# Patient Record
Sex: FEMALE | Race: WHITE
[De-identification: names, ages, dates, MRNs, and addresses within clinical notes are randomized per-mention and may not be internally consistent; named-entity substitution may affect disease eponyms.]

---

## 2017-12-30 ENCOUNTER — HOSPITAL ENCOUNTER (EMERGENCY)
Dept: HOSPITAL 63 - ER | Age: 27
Discharge: HOME | End: 2017-12-30
Payer: COMMERCIAL

## 2017-12-30 VITALS — WEIGHT: 198 LBS | HEIGHT: 62 IN | BODY MASS INDEX: 36.44 KG/M2

## 2017-12-30 VITALS — SYSTOLIC BLOOD PRESSURE: 136 MMHG | DIASTOLIC BLOOD PRESSURE: 72 MMHG

## 2017-12-30 DIAGNOSIS — K58.0: ICD-10-CM

## 2017-12-30 DIAGNOSIS — G43.909: ICD-10-CM

## 2017-12-30 DIAGNOSIS — R11.2: ICD-10-CM

## 2017-12-30 DIAGNOSIS — J45.909: ICD-10-CM

## 2017-12-30 DIAGNOSIS — E86.0: Primary | ICD-10-CM

## 2017-12-30 LAB
ALBUMIN SERPL-MCNC: 2.7 G/DL (ref 3.4–5)
ALBUMIN/GLOB SERPL: 0.8 {RATIO} (ref 1–1.7)
ALP SERPL-CCNC: 97 U/L (ref 46–116)
ALT SERPL-CCNC: 194 U/L (ref 14–59)
ANION GAP SERPL CALC-SCNC: 11 MMOL/L (ref 6–14)
AST SERPL-CCNC: 260 U/L (ref 15–37)
BASOPHILS # BLD AUTO: 0 X10^3/UL (ref 0–0.2)
BASOPHILS NFR BLD: 1 % (ref 0–3)
BILIRUB SERPL-MCNC: 0.1 MG/DL (ref 0.2–1)
BUN/CREAT SERPL: 9 (ref 6–20)
CA-I SERPL ISE-MCNC: 9 MG/DL (ref 7–20)
CALCIUM SERPL-MCNC: 8.1 MG/DL (ref 8.5–10.1)
CHLORIDE SERPL-SCNC: 104 MMOL/L (ref 98–107)
CO2 SERPL-SCNC: 25 MMOL/L (ref 21–32)
CREAT SERPL-MCNC: 1 MG/DL (ref 0.6–1)
EOSINOPHIL NFR BLD: 0 % (ref 0–3)
EOSINOPHIL NFR BLD: 0 X10^3/UL (ref 0–0.7)
ERYTHROCYTE [DISTWIDTH] IN BLOOD BY AUTOMATED COUNT: 14.2 % (ref 11.5–14.5)
GFR SERPLBLD BASED ON 1.73 SQ M-ARVRAT: 66.5 ML/MIN
GLOBULIN SER-MCNC: 3.5 G/DL (ref 2.2–3.8)
GLUCOSE SERPL-MCNC: 108 MG/DL (ref 70–99)
HCT VFR BLD CALC: 39.9 % (ref 36–47)
HGB BLD-MCNC: 14 G/DL (ref 12–15.5)
LYMPHOCYTES # BLD: 0.9 X10^3/UL (ref 1–4.8)
LYMPHOCYTES NFR BLD AUTO: 22 % (ref 24–48)
MCH RBC QN AUTO: 29 PG (ref 25–35)
MCHC RBC AUTO-ENTMCNC: 35 G/DL (ref 31–37)
MCV RBC AUTO: 82 FL (ref 79–100)
MONO #: 0.6 X10^3/UL (ref 0–1.1)
MONOCYTES NFR BLD: 15 % (ref 0–9)
NEUT #: 2.5 X10^3UL (ref 1.8–7.7)
NEUTROPHILS NFR BLD AUTO: 63 % (ref 31–73)
PLATELET # BLD AUTO: 208 X10^3/UL (ref 140–400)
POTASSIUM SERPL-SCNC: 3.1 MMOL/L (ref 3.5–5.1)
PROT SERPL-MCNC: 6.2 G/DL (ref 6.4–8.2)
RBC # BLD AUTO: 4.88 X10^6/UL (ref 3.5–5.4)
SODIUM SERPL-SCNC: 140 MMOL/L (ref 136–145)
WBC # BLD AUTO: 4 X10^3/UL (ref 4–11)

## 2017-12-30 PROCEDURE — 36415 COLL VENOUS BLD VENIPUNCTURE: CPT

## 2017-12-30 PROCEDURE — 85025 COMPLETE CBC W/AUTO DIFF WBC: CPT

## 2017-12-30 PROCEDURE — 96375 TX/PRO/DX INJ NEW DRUG ADDON: CPT

## 2017-12-30 PROCEDURE — 99284 EMERGENCY DEPT VISIT MOD MDM: CPT

## 2017-12-30 PROCEDURE — 96374 THER/PROPH/DIAG INJ IV PUSH: CPT

## 2017-12-30 PROCEDURE — 80053 COMPREHEN METABOLIC PANEL: CPT

## 2017-12-30 PROCEDURE — 96361 HYDRATE IV INFUSION ADD-ON: CPT

## 2017-12-30 NOTE — PHYS DOC
Past History


Past Medical History:  IBS, Migraines


Past Surgical History:  No Surgical History


Alcohol Use:  Occasionally


Drug Use:  None





Adult General


Chief Complaint


Chief Complaint:  NAUSEA/VOMITING/DIARRHEA





HPI


HPI





Patient is a 27-year-old female brought to the ED by her mom with a complaint 

of vomiting and diarrhea, not feeling well. Patient began to have vomiting 4 

days ago on Wednesday. Between Wednesday and Thursday she thinks she vomited 18 

times. She then developed diarrhea and had diarrhea several times, the last 

time was last evening. She continues to just feel not well. She has a cough 

that is productive and sometimes has some streaks of blood. She had abdominal 

discomfort but it's not as bad as it was. She has been feverish.





Patient was seen in her doctor's office yesterday and was given a shot of 

antibiotic, not sure what it was. It sounds like they thought she might have 

had a UTI.





Patient denies possibility of pregnancy. She uses NuvaRing.





Patient has a history of asthma, she does feel a little wheezy in the upper 

lungs.





Review of Systems


Review of Systems





Constitutional: She has felt feverish and had chills


HENT: Positive nasal congestion


Respiratory: Positive cough, productive at times


GI: As in history of present illness


: Denies dysuria or hematuria []


Musculoskeletal: She has had generalized musculoskeletal aches


Integument: Denies rash or skin lesions []


Neurologic: Denies headache, focal weakness or sensory changes []





Current Medications


Current Medications





Current Medications








 Medications


  (Trade)  Dose


 Ordered  Sig/Dayna  Start Time


 Stop Time Status Last Admin


Dose Admin


 


 Ketorolac


 Tromethamine


  (Toradol)  30 mg  1X  ONCE  12/30/17 17:45


 12/30/17 17:46 DC 12/30/17 17:51


30 MG


 


 Sodium Chloride  1,000 ml @ 


 1,000 mls/hr  Q1H  12/30/17 17:45


 12/30/17 18:44  12/30/17 17:47


1,000 MLS/HR











Allergies


Allergies





Allergies








Coded Allergies Type Severity Reaction Last Updated Verified


 


  No Known Drug Allergies    5/22/16 No











Physical Exam


Physical Exam





Constitutional: Obese female, alert, mentating normally, warm and dry. Pulse ox 

on room air 97-98%. Heart rate at rest on the cart is in the 130s.


HENT: Normocephalic, atraumatic, bilateral external ears normal,  nose normal. [

]


Eyes:  conjunctiva normal, no discharge. [] 


Neck: Normal range of motion,  no stridor. [] 


Cardiovascular:Heart rate regular rhythm, no murmur , tachycardia


Lungs & Thorax:  Bilateral breath sounds clear to auscultation with good air 

movement throughout and no wheezes heard


Abdomen: Bowel sounds normal, soft, nondistended, no tenderness, no masses, no 

pulsatile masses. [] 


Skin: Warm, dry, no erythema, no rash. [] 


Extremities: No tenderness, no cyanosis, no clubbing, ROM intact, no edema. [] 


Neurologic: Alert and oriented X 3, normal motor function, no focal deficits 

noted. []





EKG


EKG


[]





Radiology/Procedures


Radiology/Procedures


[]





Course & Med Decision Making


Course & Med Decision Making


Pertinent Labs and Imaging studies reviewed. (See chart for details)





27-year-old female in good general health presents with 4 days of what sounds 

like probably a viral gastroenteritis, started with many episodes of vomiting 

and then had several episodes of diarrhea. It sounds like that has passed. She 

now has cough, general fatigue. She is tachycardic, I suspect she is 

dehydrated. I advised the patient we will check some labs and give her some IV 

fluids, she is agreeable to that.





Patient's heart rate improved after 1 L, we will give her a second liter. Labs 

consistent with viral syndrome. She has Zofran at home. She is stable for 

discharge. See instructions for plan.





[]





Dragon Disclaimer


Dragon Disclaimer


This electronic medical record was generated, in whole or in part, using a 

voice recognition dictation system.





Departure


Departure:


Impression:  


 Primary Impression:  


 Nausea and vomiting


 Additional Impression:  


 Dehydration


Disposition:  01 HOME, SELF-CARE


Condition:  IMPROVED


Referrals:  


MADISON DANG (PCP)


Patient Instructions:  Nausea and Vomiting, Easy-to-Read





Additional Instructions:  


Small amounts of clear liquids frequently until your stomach settles. Recheck 

with your doctor if not better in 2-3 days. Return sooner if worse.





Problem Qualifiers











MANSOOR WILLIAM MD Dec 30, 2017 18:03

## 2018-04-05 ENCOUNTER — HOSPITAL ENCOUNTER (OUTPATIENT)
Dept: HOSPITAL 61 - KCIC MRI | Age: 28
Discharge: HOME | End: 2018-04-05
Payer: COMMERCIAL

## 2018-04-05 DIAGNOSIS — R07.9: ICD-10-CM

## 2018-04-05 DIAGNOSIS — M54.5: Primary | ICD-10-CM

## 2018-04-05 PROCEDURE — 72148 MRI LUMBAR SPINE W/O DYE: CPT

## 2018-06-12 ENCOUNTER — HOSPITAL ENCOUNTER (OUTPATIENT)
Dept: HOSPITAL 61 - KCIC MRI | Age: 28
Discharge: HOME | End: 2018-06-12
Payer: COMMERCIAL

## 2018-06-12 DIAGNOSIS — M25.551: Primary | ICD-10-CM

## 2018-06-12 PROCEDURE — 73721 MRI JNT OF LWR EXTRE W/O DYE: CPT

## 2018-07-04 ENCOUNTER — HOSPITAL ENCOUNTER (EMERGENCY)
Dept: HOSPITAL 63 - ER | Age: 28
Discharge: HOME | End: 2018-07-04
Payer: COMMERCIAL

## 2018-07-04 VITALS — BODY MASS INDEX: 36.82 KG/M2 | WEIGHT: 195 LBS | HEIGHT: 61 IN

## 2018-07-04 VITALS — DIASTOLIC BLOOD PRESSURE: 80 MMHG | SYSTOLIC BLOOD PRESSURE: 123 MMHG

## 2018-07-04 DIAGNOSIS — J35.8: ICD-10-CM

## 2018-07-04 DIAGNOSIS — K58.9: ICD-10-CM

## 2018-07-04 DIAGNOSIS — J45.909: ICD-10-CM

## 2018-07-04 DIAGNOSIS — G43.909: ICD-10-CM

## 2018-07-04 DIAGNOSIS — J02.9: Primary | ICD-10-CM

## 2018-07-04 LAB
ALBUMIN SERPL-MCNC: 2.9 G/DL (ref 3.4–5)
ALBUMIN/GLOB SERPL: 0.7 {RATIO} (ref 1–1.7)
ALP SERPL-CCNC: 170 U/L (ref 46–116)
ALT SERPL-CCNC: 43 U/L (ref 14–59)
ANION GAP SERPL CALC-SCNC: 10 MMOL/L (ref 6–14)
AST SERPL-CCNC: 46 U/L (ref 15–37)
BASOPHILS # BLD AUTO: 0 X10^3/UL (ref 0–0.2)
BASOPHILS NFR BLD: 0 % (ref 0–3)
BILIRUB SERPL-MCNC: 0.3 MG/DL (ref 0.2–1)
BUN/CREAT SERPL: 12 (ref 6–20)
CA-I SERPL ISE-MCNC: 11 MG/DL (ref 7–20)
CALCIUM SERPL-MCNC: 8.5 MG/DL (ref 8.5–10.1)
CHLORIDE SERPL-SCNC: 104 MMOL/L (ref 98–107)
CO2 SERPL-SCNC: 25 MMOL/L (ref 21–32)
CREAT SERPL-MCNC: 0.9 MG/DL (ref 0.6–1)
EOSINOPHIL NFR BLD: 0.1 X10^3/UL (ref 0–0.7)
EOSINOPHIL NFR BLD: 1 % (ref 0–3)
ERYTHROCYTE [DISTWIDTH] IN BLOOD BY AUTOMATED COUNT: 14.5 % (ref 11.5–14.5)
GFR SERPLBLD BASED ON 1.73 SQ M-ARVRAT: 75.1 ML/MIN
GLOBULIN SER-MCNC: 4.4 G/DL (ref 2.2–3.8)
GLUCOSE SERPL-MCNC: 91 MG/DL (ref 70–99)
HCT VFR BLD CALC: 40.8 % (ref 36–47)
HGB BLD-MCNC: 13.8 G/DL (ref 12–15.5)
LYMPHOCYTES # BLD: 2 X10^3/UL (ref 1–4.8)
LYMPHOCYTES NFR BLD AUTO: 24 % (ref 24–48)
MCH RBC QN AUTO: 28 PG (ref 25–35)
MCHC RBC AUTO-ENTMCNC: 34 G/DL (ref 31–37)
MCV RBC AUTO: 82 FL (ref 79–100)
MONO #: 0.7 X10^3/UL (ref 0–1.1)
MONOCYTES NFR BLD: 8 % (ref 0–9)
MONONUCLEOSIS PATIENT: NEGATIVE
NEUT #: 5.6 X10^3UL (ref 1.8–7.7)
NEUTROPHILS NFR BLD AUTO: 66 % (ref 31–73)
PLATELET # BLD AUTO: 191 X10^3/UL (ref 140–400)
POTASSIUM SERPL-SCNC: 3.9 MMOL/L (ref 3.5–5.1)
PROT SERPL-MCNC: 7.3 G/DL (ref 6.4–8.2)
RBC # BLD AUTO: 4.96 X10^6/UL (ref 3.5–5.4)
SODIUM SERPL-SCNC: 139 MMOL/L (ref 136–145)
WBC # BLD AUTO: 8.5 X10^3/UL (ref 4–11)

## 2018-07-04 PROCEDURE — 99285 EMERGENCY DEPT VISIT HI MDM: CPT

## 2018-07-04 PROCEDURE — 36415 COLL VENOUS BLD VENIPUNCTURE: CPT

## 2018-07-04 PROCEDURE — 87070 CULTURE OTHR SPECIMN AEROBIC: CPT

## 2018-07-04 PROCEDURE — 80053 COMPREHEN METABOLIC PANEL: CPT

## 2018-07-04 PROCEDURE — 86308 HETEROPHILE ANTIBODY SCREEN: CPT

## 2018-07-04 PROCEDURE — 96365 THER/PROPH/DIAG IV INF INIT: CPT

## 2018-07-04 PROCEDURE — 87880 STREP A ASSAY W/OPTIC: CPT

## 2018-07-04 PROCEDURE — 70491 CT SOFT TISSUE NECK W/DYE: CPT

## 2018-07-04 PROCEDURE — 85025 COMPLETE CBC W/AUTO DIFF WBC: CPT

## 2018-07-04 PROCEDURE — 83605 ASSAY OF LACTIC ACID: CPT

## 2018-07-04 PROCEDURE — 84702 CHORIONIC GONADOTROPIN TEST: CPT

## 2018-07-04 PROCEDURE — 96375 TX/PRO/DX INJ NEW DRUG ADDON: CPT

## 2018-07-04 NOTE — RAD
EXAM: Neck CT with intravenous contrast.

 

HISTORY: Sore throat.

 

TECHNIQUE: Computed tomographic images of the neck were obtained following

the administration of 75 cc Omnipaque 300 intravenous contrast.

 

*One or more of the following individualized dose reduction techniques 

were utilized for this examination:  

1. Automated exposure control.  

2. Adjustment of the mA and/or kV according to patient size.  

3. Use of iterative reconstruction technique.

 

COMPARISON: None.

 

FINDINGS: There is symmetric prominent bilateral tonsillar soft tissue. No

clear loculated fluid collection to suggest an abscess is seen. The airway

is patent. The parotid glands are unremarkable. There is a tiny focus of 

gas adjacent to the right submandibular gland, likely within a vein and 

due to recent peripheral catheterization. The submandibular glands are 

otherwise unremarkable. The thyroid gland is unremarkable. There are 

symmetric prominent submandibular lymph nodes. The lung apices are 

unremarkable. The superior mediastinum is unremarkable. The visualized 

portions the brain are unremarkable. The paranasal sinuses are clear. The 

orbits and mastoid air cells are unremarkable. There is no suspicious 

osseous lesion.

 

IMPRESSION:

1. Prominent bilateral tonsillar soft tissue. This can be seen with 

tonsillitis. No abscess is seen.

2. Prominent submandibular lymph nodes, likely physiologic or reactive in 

etiology.

 

Electronically signed by: Deirdre Llanos MD (7/4/2018 1:39 PM) [facility]

## 2018-07-04 NOTE — PHYS DOC
Past History


Past Medical History:  Asthma, IBS, Migraines


Past Surgical History:  No Surgical History


Alcohol Use:  Rarely


Drug Use:  None





Adult General


Chief Complaint


Chief Complaint:  SORE THROAT





HPI


HPI





27-year-old female patient states she has had sore throat for the last 8 days 

and currently taking amoxicillin for several days without improvement of her 

condition. Patient complaining of intermittent episodes of fever up to 100.4 

and nausea and headache problems swallowing for the same time. Patient states 

she had multiple episodes of sore throat several years ago and 2 episodes of 

strep infection for the last few months was not referred to ENT. Patient states 

her pain getting worse today and rated her pain 7/10.





Review of Systems


Review of Systems





Constitutional: Reports fever


Eyes: Denies change in visual acuity, redness, or eye pain []


HENT: Reports sore throat and earache


Respiratory: Reports mild cough, denies shortness of breath


Cardiovascular: No additional information not addressed in HPI []


GI: Denies abdominal pain, vomiting, bloody stools or diarrhea, reports nausea [

]


: Denies dysuria or hematuria []


Musculoskeletal: Denies back pain or joint pain []


Integument: Denies rash or skin lesions []


Neurologic: Denies headache, focal weakness or sensory changes []


Endocrine: Denies polyuria or polydipsia []





All other systems were reviewed and found to be within normal limits, except as 

documented in this note.





Current Medications


Current Medications





Current Medications








 Medications


  (Trade)  Dose


 Ordered  Sig/Dayna  Start Time


 Stop Time Status Last Admin


Dose Admin


 


 Clindamycin


 Phosphate  50 ml @ 


 100 mls/hr  1X  ONCE  18 12:30


 18 12:59  18 12:11


100 MLS/HR


 


 Iohexol


  (Omnipaque 300


 Mg/ml)  75 ml  1X  ONCE  18 12:15


 18 12:16 DC  





 


 Ketorolac


 Tromethamine


  (Toradol)  30 mg  1X  ONCE  18 12:30


 18 12:31 DC 18 12:10


30 MG


 


 Methylprednisolone


 Sodium Succinate


  (SOLU-Medrol


 125MG VIAL)  125 mg  1X  ONCE  18 12:30


 18 12:31 DC 18 12:10


125 MG


 


 Sodium Chloride  1,000 ml @ 


 1,000 mls/hr  1X  ONCE  18 11:45


 18 12:44  18 12:10


1,000 MLS/HR











Allergies


Allergies





Allergies








Coded Allergies Type Severity Reaction Last Updated Verified


 


  No Known Drug Allergies    16 No











Physical Exam


Physical Exam





Constitutional: Well developed, well nourished, mild, non-toxic appearance. []


HENT: Normocephalic, atraumatic, bilateral external ears normal, oropharynx 

moist, bilateral tonsillar edema and erythema with exudates, nose normal. []


Eyes: PERRLA, EOMI, conjunctiva normal, no discharge. [] 


Neck: Normal range of motion, no tenderness, supple, no stridor. [] 


Cardiovascular: Tachycardia, no murmur []


Lungs & Thorax:  Bilateral breath sounds clear to auscultation []


Abdomen: Bowel sounds normal, soft, no tenderness, no masses, no pulsatile 

masses. [] 


Skin: Warm, dry, no erythema, no rash. [] 


Back: No tenderness, no CVA tenderness. [] 


Extremities: No tenderness, no cyanosis, no clubbing, ROM intact, no edema. [] 


Neurologic: Alert and oriented X 3, normal motor function, normal sensory 

function, no focal deficits noted. []


Psychologic: Affect normal, judgement normal, mood normal. []





Current Patient Data


Vital Signs





 Vital Signs








  Date Time  Temp Pulse Resp B/P (MAP) Pulse Ox O2 Delivery O2 Flow Rate FiO2


 


18 11:22 98.7 119 20  95 Room Air  








Lab Results





 Laboratory Tests








Test


 18


11:45 18


12:00


 


Group A Streptococcus Rapid


 Negative


(NEGATIVE) 





 


White Blood Count


 


 8.5 x10^3/uL


(4.0-11.0)


 


Red Blood Count


 


 4.96 x10^6/uL


(3.50-5.40)


 


Hemoglobin


 


 13.8 g/dL


(12.0-15.5)


 


Hematocrit


 


 40.8 %


(36.0-47.0)


 


Mean Corpuscular Volume


 


 82 fL ()





 


Mean Corpuscular Hemoglobin  28 pg (25-35)  


 


Mean Corpuscular Hemoglobin


Concent 


 34 g/dL


(31-37)


 


Red Cell Distribution Width


 


 14.5 %


(11.5-14.5)


 


Platelet Count


 


 191 x10^3/uL


(140-400)


 


Neutrophils (%) (Auto)  66 % (31-73)  


 


Lymphocytes (%) (Auto)  24 % (24-48)  


 


Monocytes (%) (Auto)  8 % (0-9)  


 


Eosinophils (%) (Auto)  1 % (0-3)  


 


Basophils (%) (Auto)  0 % (0-3)  


 


Neutrophils # (Auto)


 


 5.6 x10^3uL


(1.8-7.7)


 


Lymphocytes # (Auto)


 


 2.0 x10^3/uL


(1.0-4.8)


 


Monocytes # (Auto)


 


 0.7 x10^3/uL


(0.0-1.1)


 


Eosinophils # (Auto)


 


 0.1 x10^3/uL


(0.0-0.7)


 


Basophils # (Auto)


 


 0.0 x10^3/uL


(0.0-0.2)


 


Maternal Serum HCG Beta


Subunit 


 < 1 mIU/mL


(0-6)


 


Sodium Level


 


 139 mmol/L


(136-145)


 


Potassium Level


 


 3.9 mmol/L


(3.5-5.1)


 


Chloride Level


 


 104 mmol/L


()


 


Carbon Dioxide Level


 


 25 mmol/L


(21-32)


 


Anion Gap  10 (6-14)  


 


Blood Urea Nitrogen


 


 11 mg/dL


(7-20)


 


Creatinine


 


 0.9 mg/dL


(0.6-1.0)


 


Estimated GFR


(Cockcroft-Gault) 


 75.1  





 


BUN/Creatinine Ratio  12 (6-20)  


 


Glucose Level


 


 91 mg/dL


(70-99)


 


Lactic Acid Level


 


 1.2 mmol/L


(0.4-2.0)


 


Calcium Level


 


 8.5 mg/dL


(8.5-10.1)


 


Total Bilirubin


 


 0.3 mg/dL


(0.2-1.0)


 


Aspartate Amino Transferase


(AST) 


 46 U/L (15-37)


H


 


Alanine Aminotransferase (ALT)


 


 43 U/L (14-59)





 


Alkaline Phosphatase


 


 170 U/L


()  H


 


Total Protein


 


 7.3 g/dL


(6.4-8.2)


 


Albumin


 


 2.9 g/dL


(3.4-5.0)  L


 


Albumin/Globulin Ratio


 


 0.7 (1.0-1.7)


L


 


Heterophil Agglutinins


 


 Negative


(NEGATIVE)











EKG


EKG


[]





Radiology/Procedures


Radiology/Procedures


[][facility] [address], [place] [address] [phone]


 


 IMAGING REPORT





 Signed





PATIENT: JAMES THOMAS ACCOUNT: [id number] MRN#: [id number]


: 1990 LOCATION: ER AGE: 27


SEX: F EXAM DT: 18 ACCESSION#: [id number]


STATUS: REG ER ORD. PHYSICIAN: RADHA BABIN MD 


REASON: sore throat for 8 days


PROCEDURE: CT SOFT TISSUE NECK W/CONTRAST





EXAM: Neck CT with intravenous contrast.


 


HISTORY: Sore throat.


 


TECHNIQUE: Computed tomographic images of the neck were obtained following


the administration of 75 cc Omnipaque 300 intravenous contrast.


 


*One or more of the following individualized dose reduction techniques 


were utilized for this examination:  


1. Automated exposure control.  


2. Adjustment of the mA and/or kV according to patient size.  


3. Use of iterative reconstruction technique.


 


COMPARISON: None.


 


FINDINGS: There is symmetric prominent bilateral tonsillar soft tissue. No


clear loculated fluid collection to suggest an abscess is seen. The airway


is patent. The parotid glands are unremarkable. There is a tiny focus of 


gas adjacent to the right submandibular gland, likely within a vein and 


due to recent peripheral catheterization. The submandibular glands are 


otherwise unremarkable. The thyroid gland is unremarkable. There are 


symmetric prominent submandibular lymph nodes. The lung apices are 


unremarkable. The superior mediastinum is unremarkable. The visualized 


portions the brain are unremarkable. The paranasal sinuses are clear. The 


orbits and mastoid air cells are unremarkable. There is no suspicious 


osseous lesion.


 


IMPRESSION:


1. Prominent bilateral tonsillar soft tissue. This can be seen with 


tonsillitis. No abscess is seen.


2. Prominent submandibular lymph nodes, likely physiologic or reactive in 


etiology.


 


Electronically signed by: Deirdre Llanos MD (2018 1:39 PM) [facility]





Course & Med Decision Making


Course & Med Decision Making


Pertinent Labs and Imaging studies reviewed. (See chart for details)


Evaluation of patient in ER showed 27-year-old female patient with complaining 

of sore throat for 8 days without improvement with Augmentin. Patient had 

intermittent episodes of fever and increasing of pain today. Patient had 

tonsillar erythema and edema and exudate with tachycardia without fever. Labs 

including mono and strep test and lactic acid was unremarkable and CT of soft 

tissue of neck did not show sign of abscess. Patient treated with IV fluid, 

Toradol, Solu-Medrol, clindamycin and Norco and felt better. Patient instructed 

to follow up with on-call ENT doctor and continue Augmentin besides the new 

medication prescribed by me.





Laura Disclaimer


Dragon Disclaimer


This electronic medical record was generated, in whole or in part, using a 

voice recognition dictation system.





Departure


Departure:


Impression:  


 Primary Impression:  


 Acute pharyngitis


 Additional Impression:  


 Tonsillar exudate


Disposition:   HOME, SELF-CARE (At 1355)


Condition:  IMPROVED


Referrals:  


LES JARVIS (PCP)


Patient Instructions:  Viral and Bacterial Pharyngitis





Additional Instructions:  


Drink plenty of liquids


Follow-up with your primary care physician in 3-5 days


Return to ER if not getting better


Up with on-call ENT doctor Dr. Alexis Dasilva in 2 or 3 days with the provided 

address and phone number


Scripts


Methylprednisolone (MEDROL) 4 Mg Tab.ds.pk


1 PKG PO UD, #1 PKG


   Prov: RADHA BABIN MD         18 


Hydrocodone Bit/Acetaminophen (NORCO 5-325 TABLET) 1 Each Tablet


1 TAB PO PRN Q6HRS PRN for PAIN, #14 TAB 0 Refills


   Prov: RADHA BABIN MD         18 


Clindamycin Hcl (CLINDAMYCIN HCL) 300 Mg Capsule


1 CAP PO TID, #21 CAP


   Prov: RADHA BABIN MD         18





Problem Qualifiers











RADHA BABIN MD 2018 13:01

## 2019-02-21 ENCOUNTER — HOSPITAL ENCOUNTER (EMERGENCY)
Dept: HOSPITAL 63 - ER | Age: 29
Discharge: HOME | End: 2019-02-21
Payer: COMMERCIAL

## 2019-02-21 VITALS — SYSTOLIC BLOOD PRESSURE: 152 MMHG | DIASTOLIC BLOOD PRESSURE: 96 MMHG

## 2019-02-21 VITALS — BODY MASS INDEX: 39.65 KG/M2 | HEIGHT: 61 IN | WEIGHT: 210 LBS
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
## 2019-02-21 DIAGNOSIS — S82.831A: Primary | ICD-10-CM

## 2019-02-21 DIAGNOSIS — G43.909: ICD-10-CM
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
## 2019-02-21 DIAGNOSIS — F32.9: ICD-10-CM
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
## 2019-02-21 PROCEDURE — 73610 X-RAY EXAM OF ANKLE: CPT

## 2019-02-21 PROCEDURE — 99283 EMERGENCY DEPT VISIT LOW MDM: CPT

## 2019-02-21 PROCEDURE — 29515 APPLICATION SHORT LEG SPLINT: CPT

## 2019-02-21 PROCEDURE — 73590 X-RAY EXAM OF LOWER LEG: CPT

## 2019-02-21 PROCEDURE — 73630 X-RAY EXAM OF FOOT: CPT

## 2019-02-21 NOTE — RAD
Right tibia and fibula, right foot and ankle radiograph 2/21/2019 5:49 PM

 

INDICATION: Fall today with ankle fracture

 

COMPARISON: None available.

 

TECHNIQUE:  3 views the right tibia and fibula, 3 views of the right foot 

and 3 views the right ankle are provided.

 

FINDINGS:

 

Proximal tibia and fibula are intact. There is an obliquely oriented 

fracture involving the distal one third of the fibula with intra-articular

extension to the ankle mortise. There is no significant disruption of 

ankle mortise. Lateral soft tissue swelling is present. Tibial plafond and

talar dome are intact. Bone mineralization is within normal limits. Joint 

spaces are maintained. Foot is intact. There is no soft tissue gas or 

osseous erosion.

 

IMPRESSION:

 

Mildly displaced obliquely oriented distal fibular fracture with extension

of the ankle mortise. No significant disruption of the ankle mortise. 

Stress views may be of benefit.

 

Electronically signed by: Serena Ponce MD (2/21/2019 10:52 PM) 

[facility]

## 2019-02-21 NOTE — RAD
Right tibia and fibula, right foot and ankle radiograph 2/21/2019 5:49 PM

 

INDICATION: Fall today with ankle fracture

 

COMPARISON: None available.

 

TECHNIQUE:  3 views the right tibia and fibula, 3 views of the right foot 

and 3 views the right ankle are provided.

 

FINDINGS:

 

Proximal tibia and fibula are intact. There is an obliquely oriented 

fracture involving the distal one third of the fibula with intra-articular

extension to the ankle mortise. There is no significant disruption of 

ankle mortise. Lateral soft tissue swelling is present. Tibial plafond and

talar dome are intact. Bone mineralization is within normal limits. Joint 

spaces are maintained. Foot is intact. There is no soft tissue gas or 

osseous erosion.

 

IMPRESSION:

 

Mildly displaced obliquely oriented distal fibular fracture with extension

of the ankle mortise. No significant disruption of the ankle mortise. 

Stress views may be of benefit.

 

Electronically signed by: Serena Ponce MD (2/21/2019 10:52 PM) 

[facility]

## 2019-02-21 NOTE — PHYS DOC
Past History


Past Medical History:  Depression, Migraines


 (FER FAN DO)


Past Surgical History:  Tonsillectomy


 (FER FAN DO)


Alcohol Use:  None


Drug Use:  None


 (FER FAN DO)





Adult General


Chief Complaint


Chief Complaint:  ANKLE PROBLEM





HPI


HPI





Patient is a 28-year-old female who presents with right ankle pain. 

Approximately an hour prior to arrival she slipped on snow/ice in a ditch as 

she was getting out of her car, her body went one way and her foot went the 

other. Patient has not been able to weight-bear on that side since that 

happened. Patient is taken no pain medicine. No numbness or tingling. No 

previous history of ankle or foot injury. Increased pain with movement. Pain is 

severe.[]


 (FER FAN DO)





Review of Systems


Review of Systems





Constitutional: Denies fever or chills []


Eyes: Denies change in visual acuity, redness, or eye pain []


HENT: Denies nasal congestion or sore throat []


Respiratory: Denies cough or shortness of breath []


Cardiovascular: No chest pain or palpitations[]


GI: Denies abdominal pain, nausea, vomiting, bloody stools or diarrhea []


: Denies dysuria or hematuria []


Musculoskeletal: Denies back pain, see history of present illness[]


Integument: Denies rash or skin lesions []


Neurologic: Denies headache, focal weakness or sensory changes []


Endocrine: Denies polyuria or polydipsia []





All other systems were reviewed and found to be within normal limits, except as 

documented in this note.


 (FER FAN DO)





Current Medications


Current Medications





Current Medications








 Medications


  (Trade)  Dose


 Ordered  Sig/Dayna  Start Time


 Stop Time Status Last Admin


Dose Admin


 


 Ibuprofen


  (Motrin)  600 mg  1X  ONCE  2/21/19 17:45


 2/21/19 17:46 UNV  











 (FER FAN DO)





Allergies


Allergies





Allergies








Coded Allergies Type Severity Reaction Last Updated Verified


 


  No Known Drug Allergies    5/22/16 No








 (FER FAN DO)





Physical Exam


Physical Exam





Constitutional: Well developed, well nourished, no acute distress, non-toxic 

appearance. []


HENT: Normocephalic, atraumatic, bilateral external ears normal, oropharynx 

moist, no oral exudates, nose normal. []


Eyes: PERRLA, EOMI, conjunctiva normal, no discharge. [] 


Neck: Normal range of motion, no tenderness, supple, no stridor. [] 


Cardiovascular:Heart rate regular rhythm, no murmur []


Lungs & Thorax:  Bilateral breath sounds clear to auscultation []


Abdomen: Not examined[] 


Skin: Warm, dry, no erythema, no rash. [] 


Back: No tenderness, no CVA tenderness. [] 


Extremities: Tenderness and swelling over the lateral malleolus as well as the 

calcaneus. Mild tenderness to palpation in her proximal fibula. He shouldn't is 

distal neurovascularly intact. Decreased active range of motion in the ankle 

secondary to pain.[] 


Neurologic: Alert and oriented X 3, normal motor function, normal sensory 

function, no focal deficits noted. []


Psychologic: Affect normal, judgement normal, mood normal. []


 (FER FAN DO)





Current Patient Data


Vital Signs





 Vital Signs








  Date Time  Temp Pulse Resp B/P (MAP) Pulse Ox O2 Delivery O2 Flow Rate FiO2


 


2/21/19 17:12 97.5 120 20  99 Room Air  








 [facility]





EKG


EKG


[]


 [facility]





Radiology/Procedures


Radiology/Procedures


[]


 [facility]


Radiology/Procedures


I interpretation x-ray shows a mildly displaced fracture of distal fibula.  

Ankle mortise appears to be intact. See formal report when available


 (VANESSA MINAYA MD)


Course & Med Decision Making


Course & Med Decision Making


Pertinent Labs and Imaging studies reviewed. (See chart for details)





ED course: Patient arrived, was placed in bed, and tolerated exam well. She was 

transported to and from x-ray with any complications. At 1800, her care was 

endorsed to the night emergency physician with imaging pending.[]


 ([facility]


Course & Med Decision Making


Patient keep ankle elevated, ice, splint, crutches, and take Tylenol and 

ibuprofen for pain. Patient follow-up primary care and orthopedics. Distal 

neurovascular intact after application of splint. Return if any concerns.


Impression:


1. Ankle Sprain


2. Fibular Fx. - mild displacement





-


 (VANESSA MINAYA MD)


Dragon Disclaimer


Dragon Disclaimer


This electronic medical record was generated, in whole or in part, using a 

voice recognition dictation system.


 (FER FAN DO)





Departure


Departure:


Referrals:  


PCP,UNKNOWN (PCP)


Scripts


Hydrocodone/Ibuprofen (HYDROCODONE-IBUPROFEN 7.5-200  **) 1 Each Tablet


1 TAB PO PRN Q6HRS PRN for PAIN, #30 TAB 0 Refills


   Prov: VANESSA MINAYA MD         2/21/19





Dragon Disclaimer


This chart was dictated in whole or in part using Voice Recognition software in 

a busy, high-work load, and often noisy Emergency Department environment.  It 

may contain unintended and wholly unrecognized errors or omissions.


 (VANESSA MINAYA MD)





Discharge Summary


Visit Information


Final Diagnosis


Problems


Medical Problems:


(1) Fracture


Status: Acute  





 (VANESSA MINAYA MD)


Brief Hospital Course


Allergies





 Allergies








Coded Allergies Type Severity Reaction Last Updated Verified


 


  No Known Drug Allergies    5/22/16 No








Vital Signs





Vital Signs








  Date Time  Temp Pulse Resp B/P (MAP) Pulse Ox O2 Delivery O2 Flow Rate FiO2


 


2/21/19 18:38   20  98 Room Air  


 


2/21/19 17:12 97.5 120      








Brief Hospital Course


Ms. Horner  is a 28 old female who presented with ankle sprain and fibular 

fx.  To follow up with primary and orthro. 


 (VANESSA MINAYA MD)


Discharge Information


Condition at Discharge:  Improved, Stable


Disposition/Orders:  D/C to Home


Dischare Medications





Current Medications


Ibuprofen (Motrin) 600 mg 1X  ONCE PO  Last administered on 2/21/19at 18:37;  

Start 2/21/19 at 18:00;  Stop 2/21/19 at 18:01;  Status DC


Oxycodone/ Acetaminophen (Percocet 5/325) 2 tab 1X  ONCE PO  Last administered 

on 2/21/19at 18:38;  Start 2/21/19 at 18:45;  Stop 2/21/19 at 18:46;  Status DC


Oxycodone/ Acetaminophen (Percocet 5/325) 1 tab STK-MED ONCE .ROUTE ;  Start 2/ 21/19 at 18:35;  Stop 2/21/19 at 18:36;  Status DC





Active Scripts


Active


Hydrocodone-Ibuprofen 7.5-200  ** (Hydrocodone/Ibuprofen) 1 Each Tablet 1 Tab 

PO PRN Q6HRS PRN


Medrol (Methylprednisolone) 4 Mg Tab.ds.pk 1 Pkg PO UD


Norco 5-325 Tablet (Hydrocodone Bit/Acetaminophen) 1 Each Tablet 1 Tab PO PRN 

Q6HRS PRN


Clindamycin Hcl 300 Mg Capsule 1 Cap PO TID


Zofran Odt (Ondansetron) 4 Mg Tab.rapdis 4 Mg PO Q6HRS PRN


 (VANESSA MINAYA MD)











FER FAN DO Feb 21, 2019 17:53


VANESSA MINAYA MD Feb 23, 2019 10:55

## 2019-02-21 NOTE — RAD
Right tibia and fibula, right foot and ankle radiograph 2/21/2019 5:49 PM

 

INDICATION: Fall today with ankle fracture

 

COMPARISON: None available.

 

TECHNIQUE:  3 views the right tibia and fibula, 3 views of the right foot 

and 3 views the right ankle are provided.

 

FINDINGS:

 

Proximal tibia and fibula are intact. There is an obliquely oriented 

fracture involving the distal one third of the fibula with intra-articular

extension to the ankle mortise. There is no significant disruption of 

ankle mortise. Lateral soft tissue swelling is present. Tibial plafond and

talar dome are intact. Bone mineralization is within normal limits. Joint 

spaces are maintained. Foot is intact. There is no soft tissue gas or 

osseous erosion.

 

IMPRESSION:

 

Mildly displaced obliquely oriented distal fibular fracture with extension

of the ankle mortise. No significant disruption of the ankle mortise. 

Stress views may be of benefit.

 

Electronically signed by: Serena Ponce MD (2/21/2019 10:52 PM) 

[facility]